# Patient Record
Sex: MALE | Race: AMERICAN INDIAN OR ALASKA NATIVE
[De-identification: names, ages, dates, MRNs, and addresses within clinical notes are randomized per-mention and may not be internally consistent; named-entity substitution may affect disease eponyms.]

---

## 2018-04-17 ENCOUNTER — HOSPITAL ENCOUNTER (INPATIENT)
Dept: HOSPITAL 42 - ED | Age: 78
LOS: 3 days | Discharge: HOME | DRG: 638 | End: 2018-04-20
Attending: INTERNAL MEDICINE | Admitting: INTERNAL MEDICINE
Payer: MEDICARE

## 2018-04-17 VITALS — BODY MASS INDEX: 30.3 KG/M2

## 2018-04-17 DIAGNOSIS — I10: ICD-10-CM

## 2018-04-17 DIAGNOSIS — L97.319: ICD-10-CM

## 2018-04-17 DIAGNOSIS — L03.115: ICD-10-CM

## 2018-04-17 DIAGNOSIS — I77.6: ICD-10-CM

## 2018-04-17 DIAGNOSIS — M65.9: ICD-10-CM

## 2018-04-17 DIAGNOSIS — E11.69: Primary | ICD-10-CM

## 2018-04-17 DIAGNOSIS — E78.5: ICD-10-CM

## 2018-04-17 DIAGNOSIS — E11.622: ICD-10-CM

## 2018-04-17 DIAGNOSIS — I25.10: ICD-10-CM

## 2018-04-17 DIAGNOSIS — M86.9: ICD-10-CM

## 2018-04-17 DIAGNOSIS — E66.3: ICD-10-CM

## 2018-04-17 DIAGNOSIS — Z85.46: ICD-10-CM

## 2018-04-17 DIAGNOSIS — Z79.84: ICD-10-CM

## 2018-04-17 LAB
ALBUMIN SERPL-MCNC: 4.4 G/DL (ref 3–4.8)
ALBUMIN/GLOB SERPL: 1.3 {RATIO} (ref 1.1–1.8)
ALT SERPL-CCNC: 29 U/L (ref 7–56)
APPEARANCE UR: CLEAR
AST SERPL-CCNC: 19 U/L (ref 17–59)
BASE EXCESS BLDV CALC-SCNC: 5.3 MMOL/L (ref 0–2)
BASOPHILS # BLD AUTO: 0 K/MM3 (ref 0–2)
BASOPHILS NFR BLD: 0 % (ref 0–3)
BILIRUB UR-MCNC: NEGATIVE MG/DL
BUN SERPL-MCNC: 18 MG/DL (ref 7–21)
CALCIUM SERPL-MCNC: 10.1 MG/DL (ref 8.4–10.5)
COLOR UR: YELLOW
EOSINOPHIL # BLD: 0 10*3/UL (ref 0–0.7)
EOSINOPHIL NFR BLD: 0.2 % (ref 1.5–5)
EOSINOPHIL NFR BLD: 1 % (ref 0–3)
ERYTHROCYTE [DISTWIDTH] IN BLOOD BY AUTOMATED COUNT: 13.2 % (ref 11.5–14.5)
GFR NON-AFRICAN AMERICAN: > 60
GLUCOSE UR STRIP-MCNC: NEGATIVE MG/DL
GRANULOCYTES # BLD: 2.78 10*3/UL (ref 1.4–6.5)
GRANULOCYTES NFR BLD: 47.3 % (ref 50–68)
HGB BLD-MCNC: 10.6 G/DL (ref 14–18)
LEUKOCYTE ESTERASE UR-ACNC: NEGATIVE LEU/UL
LYMPHOCYTE: 30 % (ref 22–35)
LYMPHOCYTES # BLD: 1.9 10*3/UL (ref 1.2–3.4)
LYMPHOCYTES NFR BLD AUTO: 31.7 % (ref 22–35)
MCH RBC QN AUTO: 28.6 PG (ref 25–35)
MCHC RBC AUTO-ENTMCNC: 32.5 G/DL (ref 31–37)
MCV RBC AUTO: 88.1 FL (ref 80–105)
MONOCYTE: 9 % (ref 1–6)
MONOCYTES # BLD AUTO: 1.2 10*3/UL (ref 0.1–0.6)
MONOCYTES NFR BLD: 20.8 % (ref 1–6)
NEUTROPHILS NFR BLD AUTO: 60 % (ref 50–70)
PH BLDV: 7.32 [PH] (ref 7.32–7.43)
PH UR STRIP: 7 [PH] (ref 4.7–8)
PLATELET # BLD EST: NORMAL 10*3/UL
PLATELET # BLD: 126 10^3/UL (ref 120–450)
PMV BLD AUTO: 12.3 FL (ref 7–11)
PROT UR STRIP-MCNC: (no result) MG/DL
RBC # BLD AUTO: 3.7 10^6/UL (ref 3.5–6.1)
RBC # UR STRIP: NEGATIVE /UL
RBC #/AREA URNS HPF: NEGATIVE /HPF (ref 0–2)
SP GR UR STRIP: 1.01 (ref 1–1.03)
UROBILINOGEN UR STRIP-ACNC: 0.2 E.U./DL
VENOUS BLOOD FIO2: 21 %
VENOUS BLOOD GAS PCO2: 65 (ref 40–60)
VENOUS BLOOD GAS PO2: 35 MM/HG (ref 30–55)
WBC # BLD AUTO: 5.9 10^3/UL (ref 4.5–11)
WBC #/AREA URNS HPF: NEGATIVE /HPF (ref 0–6)

## 2018-04-17 RX ADMIN — VANCOMYCIN HYDROCHLORIDE SCH MLS/HR: 1 INJECTION, POWDER, LYOPHILIZED, FOR SOLUTION INTRAVENOUS at 21:00

## 2018-04-17 NOTE — ED PDOC
Arrival/HPI





- General


Chief Complaint: Lower Extremity Problem/Injury


Time Seen by Provider: 04/17/18 15:50


Historian: Patient





- History of Present Illness


Narrative History of Present Illness (Text): 





04/17/18 17:13


77-year-old male presents today with a nonhealing wound to the medial aspect of 

the right ankle. Patient states she was sent into the emergency room by his 

podiatrist today for admission. Patient states he is having increasing pain to 

the medial aspect of the right ankle with increasing swelling and erythema. 

Patient denies fevers or chills. Denies chest pain or shortness of breath. 

Patient denies dizziness or weakness. Patient states he does not take any 

medications for pain at home. Patient states he's been on multiple antibiotics 

for this wound without improvement. Patient denies numbness weakness or 

tingling in the extremity. No other complaints.





Past Medical History





- Provider Review


Nursing Documentation Reviewed: Yes





- Travel History


Have you recently traveled outside [place] w/in the past 3 mons?: No





- Infectious Disease


Hx of Infectious Diseases: None





- Tetanus Immunization


Tetanus Immunization: Unknown





- Cardiac


Hx Cardiac Disorders: Yes


Hx Hypertension: Yes


Hx Pacemaker: No





- Pulmonary


Hx Respiratory Disorders: No





- Neurological


Hx Neurological Disorder: No


Hx Paralysis: No





- HEENT


Hx HEENT Disorder: Yes


Hx Cataracts: Yes (BILAT.)





- Renal


Hx Renal Disorder: No





- Endocrine/Metabolic


Hx Endocrine Disorders: Yes


Hx Diabetes Mellitus Type 2: Yes





- Hematological/Oncological


Hx Blood Disorders: Yes


Hx Blood Transfusions: Yes


Hx Blood Transfusion Reaction: No


Hx Cancer: Yes (prostate)





- Integumentary


Hx Dermatological Disorder: No





- Musculoskeletal/Rheumatological


Hx Musculoskeletal Disorders: Yes


Hx Arthritis: Yes


Hx Degenerative Joint Disease: Yes


Hx Falls: No


Other/Comment: HX: RIGHT SHOULDER -SURGERY"THEY PUT A NEW ONE IN.".  HX: RIGHT 

TOES- INJURY AT WORK-SURGERY TO REPAIR





- Gastrointestinal


Hx Gastrointestinal Disorders: No





- Genitourinary/Gynecological


Hx Genitourinary Disorders: Yes


Hx Prostate Cancer: Yes





- Psychiatric


Hx Psychophysiologic Disorder: No


Hx Depression: No


Hx Substance Use: No





- Surgical History


Hx Cataract Extraction: Yes (RIGHT EYE)


Hx Cardiac Catheterization: Yes


Hx Orthopedic Surgery: Yes


Other/Comment: STOMACH/FOOT SURGERY





- Anesthesia


Hx Anesthesia: Yes


Hx Anesthesia Reactions: No


Hx Malignant Hyperthermia: No





- Suicidal Assessment


Feels Threatened In Home Enviroment: No





Family/Social History





- Physician Review


Nursing Documentation Reviewed: Yes


Family/Social History: Unknown Family HX


Smoking Status: Never Smoked


Hx Alcohol Use: No


Hx Substance Use: No


Hx Substance Use Treatment: No





Allergies/Home Meds


Allergies/Adverse Reactions: 


Allergies





lisinopril Allergy (Verified 09/20/16 16:11)


 ANAPHYLAXIS








Home Medications: 


 Home Meds











 Medication  Instructions  Recorded  Confirmed


 


Aspirin [Ecotrin] 325 mg PO DAILY 08/05/15 04/17/18


 


Bicalutamide 50 mg PO DAILY 08/05/15 04/17/18


 


Raúl Cit/D3/K/Mag Ox/Stron/Bor 2 tab PO BID 08/05/15 04/17/18





[Prosteon]   


 


Metformin HCl [Metformin] 1,000 mg PO BID 08/05/15 04/17/18


 


amLODIPine [Norvasc] 10 mg PO DAILY 08/05/15 04/17/18


 


Calcium Citrate 200 mg PO DAILY 07/06/17 04/17/18














Review of Systems





- Review of Systems


Constitutional: absent: Fatigue, Fevers


Respiratory: absent: SOB, Cough


Cardiovascular: absent: Chest Pain, Palpitations


Gastrointestinal: absent: Abdominal Pain, Nausea, Vomiting


Musculoskeletal: Arthralgias.  absent: Back Pain, Neck Pain


Skin: Skin Lesions, Cellulitis


Neurological: absent: Headache, Dizziness


Psychiatric: absent: Anxiety, Depression





Physical Exam


Vital Signs Reviewed: Yes


Vital Signs











  Temp Pulse Resp BP Pulse Ox


 


 04/17/18 17:07   94 H  18  122/69  98


 


 04/17/18 15:45  99.5 F  100 H  20  124/72  98











Temperature: Afebrile


Blood Pressure: Normal


Pulse: Tachycardic


Respiratory Rate: Normal


Appearance: Positive for: Well-Appearing, Non-Toxic, Comfortable


Pain Distress: None


Mental Status: Positive for: Alert and Oriented X 3





- Systems Exam


Head: Present: Atraumatic


Mouth: Present: Moist Mucous Membranes


Respiratory/Chest: Present: Clear to Auscultation


Cardiovascular: Present: Regular Rate and Rhythm


Abdomen: No: Tenderness, Distention, Peritoneal Signs, Rebound, Guarding


Upper Extremity: Present: Normal ROM


Lower Extremity: Present: NORMAL PULSES, Tenderness (right leg; + ulceration 

noted to medial aspect of right ankle with surrounding erythema and tenderness; 

+ edema noted to foot and ankle; sensation and distal pulses intact; cap refill 

<2. ), Swelling, Erythema, Neurovascularly Intact, Capillary Refill < 2 s.  No: 

CALF TENDERNESS


Neurological: Present: GCS=15, Speech Normal


Skin: Present: Warm, Dry


Psychiatric: Present: Alert, Oriented x 3





Medical Decision Making


ED Course and Treatment: 





04/17/18 17:19


77-year-old male with ulceration to the medial aspect of the right ankle that 

has been nonhealing. Now with surrounding erythema and edema.








CBC hgb;10.6


CMP: glucose; 113


Blood cultures pending


Urine cultures pending


Wound cultures pending





Vancomycin and Zosyn started IV








Per patient's podiatrist patient had negative x-ray of the ankle and will need 

MRI to rule out osteomyelitis. 








Case discussed in depth with Dr. Briones covering for dr. isidro mosquera patients;

  accepts admission











impression; infected ulcer


admit to med/surg











- Lab Interpretations


Lab Results: 








 04/17/18 17:00 





 04/17/18 17:00 





 Lab Results





04/17/18 17:00: WBC 5.9  D, RBC 3.70, Hgb 10.6 L, Hct 32.6 L, MCV 88.1, MCH 28.6

, MCHC 32.5, RDW 13.2, Plt Count 126, MPV 12.3 H, Gran % 47.3 L, Lymph % (Auto) 

31.7, Mono % (Auto) 20.8 H, Eos % (Auto) 0.2 L, Baso % (Auto) 0.0, Gran # 2.78, 

Lymph # (Auto) 1.9, Mono # (Auto) 1.2 H, Eos # (Auto) 0.0, Baso # (Auto) 0.00, 

Neutrophils % (Manual) 60, Lymphocytes % (Manual) 30, Monocytes % (Manual) 9 H, 

Eosinophils % (Manual) 1, Platelet Evaluation Normal


04/17/18 17:00: Sodium 143, Chloride 101, Potassium 4.1, Carbon Dioxide 31, 

Anion Gap 14, BUN 18, Creatinine 1.0, Est GFR (African Amer) > 60, Est GFR (Non-

Af Amer) > 60, Random Glucose 113 H, Calcium 10.1, Total Bilirubin 0.3, AST 19, 

ALT 29, Alkaline Phosphatase 65, Total Protein 7.7, Albumin 4.4, Globulin 3.3, 

Albumin/Globulin Ratio 1.3


04/17/18 17:00: pO2 35, VBG pH 7.32, VBG pCO2 65.0 H, VBG HCO3 33.5 H, VBG 

Total CO2 35.5 H, VBG O2 Sat (Calc) 66.5 H, VBG Base Excess 5.3 H, VBG 

Potassium 4.0, Sodium 142.0, Chloride 107.0, Glucose 115 H, Lactate 1.3, FiO2 

21.0, Venous Blood Potassium 4.0


04/17/18 16:59: POC Glucose (mg/dL) 113 H











- RAD Interpretation


Radiology Orders: 








04/17/18 16:46


DUPLEX LOWER EXTRM VEIN RIGHT [US] Stat 














- Medication Orders


Current Medication Orders: 











Discontinued Medications





Vancomycin HCl (Vancomycin 1gm)  1 gm in 250 mls @ 167 mls/hr IVPB STAT STA


   PRN Reason: Protocol


   Stop: 04/17/18 18:18


   Last Admin: 04/17/18 17:53  Dose: 167 mls/hr





eMAR Start Stop


 Document     04/17/18 17:53  HI  (Rec: 04/17/18 17:53  HI  [id number])


     Intravenous Solution


      Start Date                                 04/17/18


      Start Time                                 17:53





Piperacillin Sod/Tazobactam Sod (Zosyn 3.375 In Ns 100ml)  100 mls @ 200 mls/hr 

IVPB STAT STA


   PRN Reason: Protocol


   Stop: 04/17/18 17:18


   Last Admin: 04/17/18 17:09  Dose: 200 mls/hr





eMAR Start Stop


 Document     04/17/18 17:09  RG  (Rec: 04/17/18 17:11  RG  [id number])


     Intravenous Solution


      Start Date                                 04/17/18


      Start Time                                 17:09














Disposition/Present on Arrival





- Present on Arrival


Any Indicators Present on Arrival: Yes


History of DVT/PE: No


History of Uncontrolled Diabetes: Yes


Urinary Catheter: No


History of Decub. Ulcer: No


History Surgical Site Infection Following: None





- Disposition


Have Diagnosis and Disposition been Completed?: Yes


Diagnosis: 


 Infected ulcer of skin





Disposition: HOSPITALIZED


Disposition Time: 18:43


Patient Plan: Admission


Condition: FAIR


Referrals: 


Isidro Guevara MD [Primary Care Provider] - Follow up with primary


Forms:  POW (English)

## 2018-04-17 NOTE — US
PROCEDURE:  Right lower extremity venous US 



HISTORY:

Leg pain and swelling. Evaluate for DVT.



PHYSICIAN(S):  Darryl Trevizo M.D.



TECHNIQUE:

Duplex sonography and color-flow Doppler with graded compression were 

used to evaluate the deep venous system of the right lower extremity. 



FINDINGS:

The visualized deep venous system of the right lower extremity is 

sonographically normal and compressible. Normal waveforms and 

augmentation are seen. There is no sonographic evidence for deep 

venous thrombosis in the visualized segments of the right lower 

extremity.



IMPRESSION:

1. No sonographic evidence for deep venous thrombosis in the 

visualized segments of the right lower extremity.

## 2018-04-18 RX ADMIN — VANCOMYCIN HYDROCHLORIDE SCH MLS/HR: 1 INJECTION, POWDER, LYOPHILIZED, FOR SOLUTION INTRAVENOUS at 08:17

## 2018-04-18 RX ADMIN — ASPIRIN SCH MG: 325 TABLET, DELAYED RELEASE ORAL at 10:11

## 2018-04-18 RX ADMIN — VANCOMYCIN HYDROCHLORIDE SCH MLS/HR: 1 INJECTION, POWDER, LYOPHILIZED, FOR SOLUTION INTRAVENOUS at 20:37

## 2018-04-18 RX ADMIN — INSULIN LISPRO SCH: 100 INJECTION, SOLUTION INTRAVENOUS; SUBCUTANEOUS at 00:33

## 2018-04-18 RX ADMIN — INSULIN LISPRO SCH: 100 INJECTION, SOLUTION INTRAVENOUS; SUBCUTANEOUS at 07:42

## 2018-04-18 RX ADMIN — INSULIN LISPRO SCH UNITS: 100 INJECTION, SOLUTION INTRAVENOUS; SUBCUTANEOUS at 12:04

## 2018-04-18 RX ADMIN — MEROPENEM SCH MLS/HR: 1 INJECTION INTRAVENOUS at 22:33

## 2018-04-18 RX ADMIN — METHYLPREDNISOLONE SODIUM SUCCINATE SCH MG: 40 INJECTION, POWDER, FOR SOLUTION INTRAMUSCULAR; INTRAVENOUS at 10:12

## 2018-04-18 RX ADMIN — MEROPENEM SCH: 1 INJECTION INTRAVENOUS at 20:45

## 2018-04-18 RX ADMIN — INSULIN LISPRO SCH: 100 INJECTION, SOLUTION INTRAVENOUS; SUBCUTANEOUS at 21:49

## 2018-04-18 RX ADMIN — PIPERACILLIN AND TAZOBACTAM SCH MLS/HR: 3; .375 INJECTION, POWDER, LYOPHILIZED, FOR SOLUTION INTRAVENOUS; PARENTERAL at 05:55

## 2018-04-18 RX ADMIN — METHYLPREDNISOLONE SODIUM SUCCINATE SCH MG: 40 INJECTION, POWDER, FOR SOLUTION INTRAMUSCULAR; INTRAVENOUS at 17:05

## 2018-04-18 RX ADMIN — PIPERACILLIN AND TAZOBACTAM SCH MLS/HR: 3; .375 INJECTION, POWDER, LYOPHILIZED, FOR SOLUTION INTRAVENOUS; PARENTERAL at 00:32

## 2018-04-18 RX ADMIN — ASPIRIN SCH: 325 TABLET, DELAYED RELEASE ORAL at 10:20

## 2018-04-18 RX ADMIN — INSULIN LISPRO SCH UNITS: 100 INJECTION, SOLUTION INTRAVENOUS; SUBCUTANEOUS at 17:05

## 2018-04-18 NOTE — PN
DATE:



SUBJECTIVE:  The patient is a 77-year-old who was sent by Dr. Ayala for

nonhealing ulcer.  The patient had biopsy done that was nondiagnostic. 

Also, had arterial study done that was also unremarkable for PVD, so sent

here for further followup.  The patient was also evaluated by Dr. Castillo

who did autoimmune workup, was also negative.



PHYSICAL EXAMINATION:

GENERAL:  Today, he is awake and alert, communicative.

VITAL SIGNS:  He is afebrile, pulse 95, respirations 20, blood pressure

149/100.

LUNGS:  Bilateral good airflow.  No rhonchi or crackle.

HEART:  S1 and S2 audible.

ABDOMEN:  Soft, nontender.  No rebound.  No guarding.

NEUROLOGICAL:  He is awake and alert, able to communicate.

EXTREMITIES:  He has erythema around his ankle and right foot.  Left foot

and ankle is within normal limits.



LABORATORY DATA:  His ESR is 30.  Chemistry:  Blood sugar is 187,

C-reactive protein is 6.58.  Urinalysis is unremarkable.  Ankle wound shows

gram-negative rods.  MRI is pending.  X-rays are unremarkable.



ASSESSMENT:

1.  Non-healing right ankle medial malleolar ulcer, differential is

autoimmune versus diabetic ulcer.

2.  Non-insulin dependent diabetes.

3.  Hypertension.

4.  Overweight.

5.  History of cancer of prostate.



PLAN:  Discussed with Dr. Darryl Trevizo.  We will give him high dose of

steroids.  Monitor his ulcer.  We will continue antibiotic and have local

wound care and monitor his blood sugar.







__________________________________________

Kelly Briones MD



DD:  04/18/2018 12:43:06

DT:  04/18/2018 14:04:34

Job # [id number]

## 2018-04-18 NOTE — PN
DATE:  04/18/2018



SUBJECTIVE:  This is a 77-year-old diabetic male, seen for a nonhealing

ulceration to his right lower extremity.  Patient states it started 3

months ago as a blister and has progressively gotten worse.  He states that

he saw Dr. Ayala yesterday and Dr. Ayala noted that the leg was red and

brought patient into the hospital.  Patient has a past medical history of

prostate cancer for which he is taking Casodex.  He also has a history of

type 2 diabetes.  Patient has had venous Dopplers.  He has had extremity

arterial Dopplers and everything was within normal limits.  There was no

blood clot seen in the leg and his vascular was within normal limits.  I

did state to Dr. Trevizo about this.  The patient also saw the rheumatologist

for an autoimmune workup and that workup was also negative.  Dr. Castillo,

the rheumatologist, apparently stated that there is a possibility of

placing patient on Plaquenil and prednisone if vasculitis is noted. 

Patient also had a skin biopsy and it was noted that he did have a focal

vasculitis and stromal changes in the epidermis and it was consistent with

venous stasis changes and it was also negative for malignancy.



PHYSICAL EXAMINATION:

VITAL SIGNS:  Patient's vital signs were noted today.  His temperature is

98.6, his pulse is 95, blood pressure is 149/100, his respiratory rate was

20 and his oxygen sat is 97.



MEDICATIONS:  Noted on the MAR and he is presently on vancomycin as per

Infectious Disease.  He has been started on methylprednisolone for

vasculitis.  He is on Norvasc.  He is being covered with Humalog.  He is

taking Glucophage, Ecotrin and Casodex.



LABORATORY DATA:  Patient's labs were reviewed.  His white blood cell count

is 5.9.  His ESR is 30.  H and H is 10.6 and 32.6 and platelets are 126. 

He has granulocytes of 47.3, lymphocytes are 31.1 and this is a shift to

the right, not to the left, which would be for an acute infection. 

Patient's chemistry is grossly within normal limits.  His random glucose at

this time is 113 yesterday.  Today, it is 187.



Patient as noted has been started on prednisone.  Unfortunately, that will

make those glucoses erratic.  His microbiology for all the visits were

coag-negative Staph that I have seen in the computer, Gram stain done

yesterday was pending, and clinically the patient has 2/4 palpable pedal

pulses.  His temperature gradient is warm on the right and within normal

limits on the left.  He does have a +3 edema to the right foot and ankle

and no edema is noted on the left foot and ankle.  Patient is severely

painful with palpation of his Achilles tendon and the tenderness is more so

at the mild tenderness junction and at the insertion.  He also has a lot of

pain with forced dorsiflexion of his foot, again in the area of the calf

and the Achilles tendon.  There does not appear to be any fluctuance around

the wound. There is redness around the wound and the tissue is fibrous with

some hemorrhage tissue in the upper corner of the wound.  The wound

measures approximately 2 x 2.5 x 0.1 cm.  There is no sinus tract.  There

is no fluctuance noted.



ASSESSMENT:  A non-healing ulceration to the right leg, nonpressure

ulceration.



PLAN OF TREATMENT:  MRI was ordered.  I asked them to pinpoint on the

Achilles tendon to rule out any Achilles tendon tear.  We will also look

for any abscess in the leg.  Patient might need a second biopsy that we can

send this to a Dermpath.  X-rays were also ordered to take a look at the

bones on the ankles and some medications he is on can cause osteopenia and

it also causes angioedema and edema of the extremities.  This was all

discussed with patient and at this time, we will await the findings of the

imaging and I think, the patient most likely will need compression

dressings if all these are negative.  Patient was dressed with a film

dressing today and we ordered Bactroban ointment to be placed on the wound

under the film.





__________________________________________

Ifrah Curtis DPM



DD:  04/18/2018 11:47:50

DT:  04/18/2018 13:46:32

Job # [id number]

## 2018-04-18 NOTE — HP
HISTORY OF PRESENT ILLNESS:  The patient is a 77-year-old ,

very pleasant gentleman, who was referred by Dr. Ayala for nonhealing

ulcer and wound on the right ankle medial malleolar part.  He has been

followed with him regularly in wound care center.  He states that he has

pain on _____ has been increasingly getting worse along with swelling and

redness.  Denies any trauma to the area, no history of falls, no history of

fever or chills, no history of nausea or vomiting, no diarrhea, no chest

pain, no shortness of breath.  The patient states he has been regularly

following with the podiatrist and has multiple rounds of antibiotics.



PAST MEDICAL HISTORY:  Significant for

1.  Hypertension.

2.  Non-insulin-dependent diabetes.

3.  History of CA prostate, on Casodex.

4.  History of penile lesion excision.

5.  History of degenerative disk disease.



PAST SURGICAL HISTORY:  Significant for

1.  Bilateral cataract extraction.

2.  Status post right shoulder surgery.



ALLERGY:  HE IS ALLERGIC TO LISINOPRIL.



MEDICATIONS AT HOME:  He is on

1.  Multivitamin.

2.  Aspirin 325 daily.

3.  Norvasc 10 mg daily.

4.  Metformin 1000 twice a day.

5.  Casodex 50 mg daily.



SOCIAL HISTORY:  Denies smoking, drinking or alcohol use.



PHYSICAL EXAMINATION:

GENERAL:  He is awake, alert, oriented, communicative.

VITAL SIGNS:  His temperature 99.5, pulse 100, respirations 20, blood

pressure 124/72.

LUNGS:  Bilateral good airflow.  No rhonchi or crackle.

HEART:  S1 and S2 audible.  No murmur.

ABDOMEN:  Soft, nontender, no rebound, no guarding.

NEUROLOGIC:  The patient is awake and alert, communicative.

EXTREMITIES:  Right _____ medial malleolar aspect, there is ulceration on

the medal aspect along with erythema and palpable tenderness, also swelling

of the foot and ankle.  Distal pulses seem to be intact.



LABORATORY DATA:  WBC 5.9, hemoglobin 10.6, hematocrit 32.6, platelets of

126.  Chemistry:  Sodium 143, potassium 4.1, chloride 101, CO2 of 31, BUN

18, creatinine 1, blood sugar of 113.  LFTs are within normal limits. 

Urinalysis is unremarkable.  He had leg Doppler done that was negative for

DVT.  X-ray of the ankle done on 04/10/2018 is normal.



ASSESSMENT AND PLAN:

1.  Nonhealing right ankle medial malleolar wound.

2.  Non-insulin-dependent diabetes.

3.  Hypertension.

4.  History of cancer of prostate.



PLAN:  The patient will be admitted.  We will give him IV antibiotic,

monitor blood sugar.  ID consult by Dr. Rivera and Podiatry consult by

Dr. Ayala has been requested.







__________________________________________

Kelly Briones MD



DD:  04/17/2018 19:34:27

DT:  04/17/2018 21:40:48

Job # [id number]

## 2018-04-18 NOTE — RAD
PROCEDURE:  Right Ankle Radiographs.



HISTORY:

Pain



COMPARISON:

None



FINDINGS:



BONES:

No evidence of acute displaced fracture nor dislocation. . The 

osseous structures appear intact. Talar dome intact.  Small plantar 

and posterior calcaneal enthesophyte formation noted. 



JOINTS:

Ankle mortise maintained Degenerative changes of the tibiotalar 

articulation.  There is also a calcification along the lateral aspect 

of the distal right tibial diametaphysis which may be secondary to an 

old posttraumatic sequela or possibly ossification of the 

interosseous membrane. 



SOFT TISSUES:

There is mild soft tissue swelling over the medial and lateral 

malleoli former slightly more so than latter. 



OTHER FINDINGS:

None.



IMPRESSION:

No acute fractures.  Degenerative osteoarthritis as described.  

Probable ossification interosseous membrane.



Mild bilateral soft tissue swelling medial slightly greater than 

lateral.

## 2018-04-19 VITALS — RESPIRATION RATE: 20 BRPM

## 2018-04-19 LAB
ALBUMIN SERPL-MCNC: 3.8 G/DL (ref 3–4.8)
ALBUMIN/GLOB SERPL: 1.2 {RATIO} (ref 1.1–1.8)
ALT SERPL-CCNC: 21 U/L (ref 7–56)
AST SERPL-CCNC: 27 U/L (ref 17–59)
BUN SERPL-MCNC: 21 MG/DL (ref 7–21)
CALCIUM SERPL-MCNC: 9.7 MG/DL (ref 8.4–10.5)
GFR NON-AFRICAN AMERICAN: > 60
T4 FREE SERPL-MCNC: 1.26 NG/DL (ref 0.78–2.19)

## 2018-04-19 PROCEDURE — 02HV33Z INSERTION OF INFUSION DEVICE INTO SUPERIOR VENA CAVA, PERCUTANEOUS APPROACH: ICD-10-PCS | Performed by: INTERNAL MEDICINE

## 2018-04-19 PROCEDURE — B548ZZA ULTRASONOGRAPHY OF SUPERIOR VENA CAVA, GUIDANCE: ICD-10-PCS | Performed by: INTERNAL MEDICINE

## 2018-04-19 RX ADMIN — MEROPENEM SCH MLS/HR: 1 INJECTION INTRAVENOUS at 13:03

## 2018-04-19 RX ADMIN — INSULIN LISPRO SCH UNITS: 100 INJECTION, SOLUTION INTRAVENOUS; SUBCUTANEOUS at 16:27

## 2018-04-19 RX ADMIN — VANCOMYCIN HYDROCHLORIDE SCH MLS/HR: 1 INJECTION, POWDER, LYOPHILIZED, FOR SOLUTION INTRAVENOUS at 08:03

## 2018-04-19 RX ADMIN — INSULIN LISPRO SCH UNITS: 100 INJECTION, SOLUTION INTRAVENOUS; SUBCUTANEOUS at 12:00

## 2018-04-19 RX ADMIN — MEROPENEM SCH MLS/HR: 1 INJECTION INTRAVENOUS at 21:32

## 2018-04-19 RX ADMIN — MEROPENEM SCH MLS/HR: 1 INJECTION INTRAVENOUS at 06:35

## 2018-04-19 RX ADMIN — INSULIN LISPRO SCH UNITS: 100 INJECTION, SOLUTION INTRAVENOUS; SUBCUTANEOUS at 08:02

## 2018-04-19 RX ADMIN — ASPIRIN SCH MG: 325 TABLET, DELAYED RELEASE ORAL at 10:47

## 2018-04-19 RX ADMIN — INSULIN LISPRO SCH: 100 INJECTION, SOLUTION INTRAVENOUS; SUBCUTANEOUS at 20:00

## 2018-04-19 RX ADMIN — METHYLPREDNISOLONE SODIUM SUCCINATE SCH MG: 40 INJECTION, POWDER, FOR SOLUTION INTRAMUSCULAR; INTRAVENOUS at 01:44

## 2018-04-19 RX ADMIN — VANCOMYCIN HYDROCHLORIDE SCH MLS/HR: 1 INJECTION, POWDER, LYOPHILIZED, FOR SOLUTION INTRAVENOUS at 18:29

## 2018-04-19 RX ADMIN — METHYLPREDNISOLONE SODIUM SUCCINATE SCH MG: 40 INJECTION, POWDER, FOR SOLUTION INTRAMUSCULAR; INTRAVENOUS at 10:46

## 2018-04-19 NOTE — CON
DATE:  04/18/2018



REASON FOR CONSULTATION:  Prostate cancer, nonhealing ulcer of left ankle.



HISTORY OF PRESENT ILLNESS:  Mr. Tsang is a 77-year-old 

male admitted to the hospital with right ankle ulceration, which has been

there for three months.  He was diagnosed with prostate cancer nine years

ago, underwent prostatectomy.  Since then, he has been on Casodex and

Lupron every three months.  There was a concern whether Casodex is causing

nonhealing ulcer on the right leg.  As per information given by the

patient, he has been in remission from prostate cancer.  He follows with

Dr. Trujillo, Urology in [place].  He has received multiple rounds of

antibiotics for right ankle ulceration.



PAST MEDICAL HISTORY:  Hypertension, diabetes mellitus type 2, prostate

cancer, history of degenerative disc disease.



PAST SURGICAL HISTORY:  Bilateral cataract extraction, status post shoulder

surgery, prostatectomy.



ALLERGIES:  LISINOPRIL.



HOME MEDICATIONS:  Multivitamin, Casodex, metformin, Norvasc, aspirin.



SOCIAL HISTORY:  Denies any smoking.  No history of alcohol abuse.



FAMILY HISTORY:  Noncontributory.



REVIEW OF SYSTEMS:  As per HPI.  Rest of 12-point review of systems

reviewed negative.



PHYSICAL EXAMINATION:

GENERAL:  Awake, alert, oriented, communicative.

VITAL SIGNS:  Stable.  Temperature 98.7, heart rate 80 per minute,

respiratory rate 18 per minute, blood pressure 124/74.

HEENT:  Normal.

NECK:  No lymphadenopathy.

CHEST:  Air entry present and equal bilaterally.  No added sounds.

CARDIOVASCULAR:  S1, S2 normal.  No murmur.  No gallop.

ABDOMEN:  Soft, nontender.  No hepatosplenomegaly.  No rigidity.  No

guarding.

NEURO:  Awake, alert, oriented x3.  No focal sensory or motor deficit.

EXTREMITIES:  Right leg superficial ulceration with whitish base.  No

secretions present on the medial malleolus.



LABORATORY DATA:  Hemoglobin 10.6, white count 5.9, hematocrit 32.6,

platelets 126.  Sodium 143, potassium 4.1.  BUN 18, creatinine 1.  Blood

sugar 113.



ASSESSMENT:

1.  Prostate cancer.

2.  Nonhealing ulcer on the right malleolus.

3.  Hypertension.

4.  Diabetes mellitus type 2.



PLAN:

1.  Prostate cancer.  We will do the PSA level.  As per information given

by the patient, he has been in remission.  He denies any imaging done for

prostate cancer.  We will await PSA results.  If elevated PSA, we will do a

bone scan.  MRI of the right ankle has been ordered.  MANJIT did not show

peripheral vascular disease.

2.  ID, right ankle showing gram-negative rods.  ID Dr. Rivera is

following.  He is currently on broad-spectrum antibiotics.

3.  There is concern for Casodex causing nonhealing right ulcer.  Casodex

is not related to nonhealing ulcer, neither it has been known to decrease

immunity.  I would recommend continuing Casodex and Lupron.

4.  We will consider staging workup for prostate cancer.  CT of chest,

abdomen, pelvis and bone scans since it has been 10 years since diagnosis. 

I discussed with the patient, discussed with Dr. Briones.



Thank you, Dr. Briones, for allowing us to participate in Mr. Tsang' care.





__________________________________________

Aurelia Dennis MD



DD:  04/18/2018 23:18:33

DT:  04/19/2018 0:01:22

Job # [id number]

## 2018-04-19 NOTE — MRI
MRI right tibia and fibula 



History: Ulcer.  Evaluate for osteomyelitis. 



Comparison: None available. 



Technique: Multi-echo multiplanar sequences were performed through 

the right tibia and fibula without the use of intravenous contrast. 



Findings: 



Prominent soft tissue ulcer seen within the medial soft tissues at 

the level of the distal medial tibia.  Signal abnormality within the 

medial cortex of the medial malleolus of the distal tibia with patchy 

increased STIR signal and patchy decreased T1 signal suggestive for 

an acute osteomyelitis. Clinical correlation. Please see separate MR 

report of the right ankle for better delineation of this region.  

Mild reactive edema seen within the posterior musculature at that 

level which may represent an underlying myositis. 



Reticulation and edema within the circumferential subcutaneous soft 

tissues of the lower extremity suggestive for an underlying 

cellulitis. 



Remainder of the visualized osseous structures and musculature appear 

grossly preserved. 



Impression: 



Prominent soft tissue ulcer seen within the medial soft tissues at 

the level of the distal medial tibia.  Signal abnormality within the 

medial cortex of the medial malleolus of the distal tibia with patchy 

increased STIR signal and patchy decreased T1 signal suggestive for 

an acute osteomyelitis. Clinical correlation. Please see separate MR 

report of the right ankle for better delineation of this region. Mild 

reactive edema seen within the posterior musculature at that level 

which may represent an underlying myositis. 



Reticulation and edema within the circumferential subcutaneous soft 

tissues of the lower extremity suggestive for an underlying 

cellulitis.

## 2018-04-19 NOTE — PN
DATE:



SUBJECTIVE:  The patient is a 77-year-old black male, seen and examined. 

He stats his pain and swelling is much better.  Denies any nausea, vomiting

or diarrhea.



PHYSICAL EXAMINATION:

VITAL SIGNS:  He is afebrile.  Pulse 96, respirations 20, blood pressure

135/75.

LUNGS:  Bilateral good airflow.  No rhonchi or crackle.

HEART:  S1, S2 audible.

ABDOMEN:  Soft, nontender.  No rebound.  No guarding.

NEUROLOGIC:  Awake, alert, oriented and communicative.



LABORATORY DATA:  His ESR is 30.  Chemistry:  Sodium 142, potassium 3.8,

chloride 106, CO2 of 27, BUN 21, creatinine 0.9.  Blood sugar of 200. 

Hemoglobin A1c is 7.6.



DIAGNOSTIC DATA:  His MRI of the lower extremity including ankle shows

acute osteomyelitis with prominent soft tissue ulcers on the medial soft

tissue at distal medial tibia consistent with acute osteomyelitis.



ASSESSMENT:

1.  Right medial malleolus osteomyelitis.

2.  Non-insulin dependent diabetes.

3.  Hypertension.

4.  History of cancer of prostate.



PLAN:  I will discontinue Solu-Medrol.  Patient need 4 to 6 week of

meropenem and vancomycin and we will continue local wound care.  Patient is

scheduled for PICC line.  Once arrangement is made, patient will be sent to

subacute rehab for completion of course of antibiotics.







__________________________________________

Kelly Briones MD



DD:  04/19/2018 17:01:21

DT:  04/19/2018 19:50:35

Job # [id number]

## 2018-04-19 NOTE — CON
DATE:  04/18/2018



LOCATION:  The patient is seen earlier in room 564, bed 1.



CHIEF COMPLAINT:  Right ankle ulcer times several days.



HISTORY OF PRESENT ILLNESS:  This is a 77-year-old male with history of

diabetes mellitus, hypertension, hyperlipidemia, cataract, arthritis,

degenerative joint disease, prostate cancer, who has had a lesion that

started off with bullous lesion and it broke out in the right medial aspect

of his right ankle.  He denies any fevers and chills.  No chest pain.  No

abdominal pain, diarrhea or constipation.  No bright red blood per rectum. 

No melena.



REVIEW OF SYSTEMS:  Twelve-point review of systems is noted.  The patient

has no headaches, blurred vision, neck pain, sore throat and no chest pain,

shortness of breath or cough.  No diarrhea or constipation.  No bright red

blood per rectum.



PAST MEDICAL HISTORY:  Significant for diabetes mellitus, coronary artery

disease, hyperlipidemia, hypertension, cataracts, arthritis, degenerative

joint disease and prostate cancer.



PAST SURGICAL HISTORY:  Significant for radical prostatectomy, right

shoulder surgery and a bilateral cataract surgery.



ALLERGIES:  THE PATIENT IS ALLERGIC TO LISINOPRIL.



MEDICATIONS AT HOME:  Include the patient to be on metformin.



PHYSICAL EXAMINATION:

GENERAL:  The patient is in bed.

VITAL SIGNS:  Temperature of 98, heart rate of 100, respiratory rate of 20,

blood pressure is 140/100.

HEENT:  Examination of HEENT is unremarkable.

NECK:  Supple.

LUNGS:  Have decreased breath sounds.

HEART:  Normal S1 and S2.

ABDOMEN:  Soft, nontender.

EXTREMITIES:  Ankle ulcer is on the right medial aspect of the right ankle.

There is an open ulcer and there is erythema around it and discharge.



LABORATORY DATA:  Laboratory examination reveals the patient has a white

count of 5.9, hemoglobin of 10, platelets of 126.  Sed rate of 30.  BUN of

18, creatinine of 1, random glucose is 113.  C-reactive protein is 6.58. 

Sed rate is 30.  Urinalysis is noted.  Blood cultures are negative.  The

urine culture is not done.  Ankle culture is growing a gram-negative colin.



ASSESSMENT AND PLAN:  A 77-year-old male with diabetes mellitus, prostate

cancer, hypertension, coronary artery disease, hyperlipidemia and

cataracts, arthritis, degenerative joint disease.  He had a right ankle

medial leg ulcer and cellulitis and with gram-negative rods.  He is on

meropenem at this time and the patient is already on vancomycin.  The

patient is scheduled for MRI to rule out underlying osteomyelitis. 

Vascular consultation and Podiatry consultation are needed and appreciated.

We will make further recommendations upon availability of initial results. 

We will follow closely with you.



__________________________________________

Arian Rivera MD





DD:  04/18/2018 19:33:35

DT:  04/19/2018 1:52:41

Job # [id number]

## 2018-04-19 NOTE — RAD
HISTORY:

CONFIRM PICC LINE PLACEMENT  



COMPARISON:

No prior. 



FINDINGS:



LUNGS:

No active pulmonary disease.



PLEURA:

No significant pleural effusion identified, no pneumothorax apparent.



CARDIOVASCULAR:

 No radiographic findings to suggest acute or significant 

cardiovascular disease. PICC line in satisfactory position, tip is in 

the SVC.



OSSEOUS STRUCTURES:

No significant abnormalities.



VISUALIZED UPPER ABDOMEN:

Normal.



OTHER FINDINGS:

None.



IMPRESSION:

Status post PICC placement, the tip of the catheter is in the SVC. No 

pneumothorax identified.

## 2018-04-19 NOTE — MRI
MRI right ankle 



History: Ulcer.  Evaluate for osteomyelitis. 



Comparison: None available. 



Technique: Multi-echo multiplanar sequences were performed through 

the right ankle without the use of intravenous contrast. 



Findings 



1.4 centimeter soft tissue ulcer/defect overlying the medial 

malleolus of the distal tibia. Adjacent signal abnormality within the 

medial aspect of the medial malleolus of the distal tibia best seen 

on series 2, image 25 with increased STIR signal and minimal patchy 

decreased T1 signal suggestive for acute osteomyelitis. 



Reticulation and edema within the circumferential subcutaneous soft 

tissues. 



Anterior extensor tendons are preserved. 



Mild tenosynovitis of the posterior tibial tendon sheath. 



Flexor digitorum longus tendon appears preserved. 



Mild tenosynovitis of the flexor hallucis tendon. 



Peroneal tendons are preserved. 



Probable chronic partial tearing of the posterior tibiofibular 

ligament/syndesmosis with heterotopic bone and or bony exostosis 

emanating from the posterior lateral cortex of the distal tibia 

measuring up to 8 millimeters. Clinical correlation. 



Additional chronic partial tearing of the anterior tibiofibular 

ligament/syndesmosis. 



Anterior and posterior talofibular ligaments are grossly preserved. 



Mild increased signal at the level of the Lisfranc ligament which may 

represent a low grade sprain. 



Suggestion of signal changes seen within the medial aspect of the 

middle cuneiform bone suggestive for osteochondral change. 



Achilles tendon is preserved. 



Thickening of the plantar fascia measuring up to 1.2 centimeters with 

adjacent bony spurring suggestive for a moderate plantar fascitis. 



Signal abnormality within the sinus tarsi with decreased T1 signal 

and increased STIR signal suggestive for a moderate sinus tarsi 

syndrome.  Reactive edema seen within the mid talus. 



Small ankle joint effusion. 



Narrowing of the tibiotalar joint space. 



Degenerative changes with bony spurring noted at the dorsal aspect of 

the talonavicular joint space. 



Degenerative changes noted at the articulation of the anterior 

calcaneus and navicular bone. 



Mild increased signal within the deep fibers of the deltoid ligament. 



Prominence of the middle subtalar joint space. 



Impression: 



1. 1.4 centimeter soft tissue ulcer/defect overlying the medial 

malleolus of the distal tibia. Adjacent signal abnormality within the 

medial aspect of the medial malleolus of the distal tibia best seen 

on series 2, image 25 with increased STIR signal and minimal patchy 

decreased T1 signal suggestive for acute osteomyelitis. 



2. Mild tenosynovitis of the posterior tibial tendon sheath. 



3. Mild tenosynovitis of the flexor hallucis tendon. 



4. Probable chronic partial tearing of the posterior tibiofibular 

ligament/syndesmosis with heterotopic bone and or bony exostosis 

emanating from the posterior lateral cortex of the distal tibia 

measuring up to 8 millimeters. Clinical correlation. 



5. Additional chronic partial tearing of the anterior tibiofibular 

ligament/syndesmosis. 



6. Mild increased signal at the level of the Lisfranc ligament which 

may represent a low grade sprain. 



7. Suggestion of signal changes seen within the medial aspect of the 

middle cuneiform bone suggestive for osteochondral change. 



8. Thickening of the plantar fascia measuring up to 1.2 centimeters 

with adjacent bony spurring suggestive for a moderate plantar 

fascitis. 



9. Signal abnormality within the sinus tarsi with decreased T1 signal 

and increased STIR signal suggestive for a moderate sinus tarsi 

syndrome.  Reactive edema seen within the mid talus. 



10. Small ankle joint effusion. 



11. Narrowing of the tibiotalar joint space. 



12. Degenerative changes with bony spurring noted at the dorsal 

aspect of the talonavicular joint space. 



13. Degenerative changes noted at the articulation of the anterior 

calcaneus and navicular bone. 



14. Mild increased signal within the deep fibers of the deltoid 

ligament. 



15. Prominence of the middle subtalar joint space.

## 2018-04-19 NOTE — PN
DATE:  04/19/2018



SUBJECTIVE:  The patient is in bed, was seen in room 564, bed 1.  No fevers

and no chills and he states that his leg is much improved.



PHYSICAL EXAMINATION:

VITAL SIGNS:  Temperature is 98, blood pressure is 137/75, heart rate of

96, respiratory rate of 20.  The patient is saturating at 94%.

HEENT:  Unremarkable.

NECK:  Supple.

Lungs:  Have decreased breath sounds.

HEART:  Normal S1, S2.

ABDOMEN:  Soft, nontender.



LABORATORY DATA:  Reveals a white count of 5000 with a hemoglobin of 10,

platelets of 126, sed rate is 30.  Coagulation is noted.  BUN of 21,

creatinine of 0.9.  Urinalysis is noted.  Microbiology reveals an ankle

culture is positive for Corynebacterium species and heavy growth of

Enterobacter cloacae.  Enterobacter cloacae is _____ to ertapenem, but

sensitive to Cipro, cefepime, and meropenem.  There is no sensitivity for

Corynebacterium species with moderate growth.  Review of the orders reveals

the patient to be on meropenem and vancomycin.  The blood cultures are

reported to be negative.  The MRI reveals the patient to have acute

osteomyelitis.



ASSESSMENT AND PLAN:  This is a 77-year-old male with diabetes mellitus,

prostate cancer, hypertension, coronary artery disease, hyperlipidemia,

cataract, arthritis, degenerative joint disease, right ankle medial ulcer

and cellulitis with Enterobacter cloacae and Corynebacterium. 

Osteomyelitis by MRI.  Currently, on vancomycin and meropenem.  The patient

also had an ankle MRI, which reveals mild tenosynovitis, probable chronic

partial tearing of the posterior tibiofibular ligament.  Should have

vascular workup and we will follow with you.







__________________________________________

Arian Rivera MD



DD:  04/19/2018 19:04:20

DT:  04/19/2018 19:07:04

Job # [id number]

## 2018-04-19 NOTE — CP.PCM.PN
Subjective





- Date & Time of Evaluation


Date of Evaluation: 04/19/18


Time of Evaluation: 09:08





- Subjective


Subjective: 





Podiatry Progress note: Dr. Ayala/Dr. Curtis





77 year old male was seen and evaluated this morning with attending Dr. Curtis 

for right leg wound. Patient reports that he is feeling a lot better and the 

pain has reduced a lot. Denies of having any acute overnight events. Denies of 

having any F/N/V/C/SOB/CP/headache. Denies of any other pedal complains at this 

time. 





Objective





- Vital Signs/Intake and Output


Vital Signs (last 24 hours): 


 











Temp Pulse Resp BP Pulse Ox


 


 98.2 F   56 L  18   143/81   94 L


 


 04/19/18 06:00  04/19/18 06:00  04/19/18 06:00  04/19/18 06:00  04/19/18 06:00








Intake and Output: 


 











 04/19/18 04/19/18





 06:59 18:59


 


Intake Total 540 


 


Output Total 1500 


 


Balance -960 














- Medications


Medications: 


 Current Medications





Amlodipine Besylate (Norvasc)  10 mg PO DAILY [facility]


   Last Admin: 04/18/18 10:11 Dose:  10 mg


Aspirin (Ecotrin)  325 mg PO DAILY [facility]


   Last Admin: 04/18/18 10:20 Dose:  Not Given


Bicalutamide (Casodex)  50 mg PO DAILY [facility]


   Last Admin: 04/18/18 10:58 Dose:  50 mg


Glimepiride (Amaryl)  2 mg PO ACBD [facility]


   Last Admin: 04/19/18 08:02 Dose:  2 mg


Vancomycin HCl (Vancomycin 1gm)  1 gm in 250 mls @ 167 mls/hr IVPB Q12H [facility]


   PRN Reason: Protocol


   Last Admin: 04/19/18 08:03 Dose:  167 mls/hr


Meropenem (Merrem Iv 1 Gm Premix)  50 mls @ 100 mls/hr IVPB Q8 [facility]


   PRN Reason: Protocol


   Stop: 04/27/18 19:29


   Last Admin: 04/19/18 06:35 Dose:  100 mls/hr


Insulin Human Lispro (Humalog High)  0 units SC ACHS [facility]


   PRN Reason: Protocol


   Last Admin: 04/19/18 08:02 Dose:  4 units


Metformin HCl (Glucophage)  1,000 mg PO BID [facility]


   Last Admin: 04/18/18 17:05 Dose:  1,000 mg


Methylprednisolone (Solu-Medrol)  40 mg IVP Q8H [facility]


   Last Admin: 04/19/18 01:44 Dose:  40 mg


Mupirocin (Bactroban Ointment)  0 gm TOP BID [facility]


   Last Admin: 04/18/18 17:05 Dose:  1 gm











- Labs


Labs: 


 





 04/19/18 06:20 











- Constitutional


Appears: Well, Non-toxic, No Acute Distress





- Extremities Exam


Additional comments: 





Right LE focused exam:





VASC: DP/PT pulses are palpable 2/4, Cap refill time: < 3 sec to all digits, 

Temp gradient: warm to cool from proximal to distal, no pitting or non-pitting 

edema noted on the RLE





DERM: Wound measuring approx. 3.5 cm x 3.5 cm x 0.1 cm on the medial aspect of 

the right ankle, wound base is mainly granular with islands of fibrosis, 

periwound erythema noted which appears to be decreasing, no active drainage, no 

purulence, no tunneling, no undermining, no probe to bone, no malodor





NEURO: protective sensation grossly intact





ORTHO: Pain on palpation of the wound, AROM and PROM at the AJ present in all 4 

directions





- Neurological Exam


Neurological Exam: Alert, Awake, Oriented x3





- Psychiatric Exam


Psychiatric exam: Normal Affect, Normal Mood





Assessment and Plan





- Assessment and Plan (Free Text)


Assessment: 





77 year old male with right medial ankle wound (nenis grade 1)


Plan: 





Patient seen and evaluated with attending Dr. Curtis


Labs, vitals and charts reviewed


Wound cleaned with saline and dressing applied using bactroban, optifoam


Wound cx: prelim - G negative rods


Right ankle X-rays: No acute changes, no signs of acute OM, increase in soft 

tissue density on medial ankle consistent with cellulitic changes


Await MRI


Continue methylprednisolone for 1 week


IV abx as per ID - Meropenem and Vancomycin


Podiatry to follow patient while in-house


Upon discharge, follow up at wound care center for further care

## 2018-04-20 VITALS
TEMPERATURE: 98.2 F | SYSTOLIC BLOOD PRESSURE: 136 MMHG | DIASTOLIC BLOOD PRESSURE: 82 MMHG | HEART RATE: 48 BPM | OXYGEN SATURATION: 98 %

## 2018-04-20 RX ADMIN — INSULIN LISPRO SCH: 100 INJECTION, SOLUTION INTRAVENOUS; SUBCUTANEOUS at 07:35

## 2018-04-20 RX ADMIN — MEROPENEM SCH MLS/HR: 1 INJECTION INTRAVENOUS at 07:32

## 2018-04-20 RX ADMIN — INSULIN LISPRO SCH: 100 INJECTION, SOLUTION INTRAVENOUS; SUBCUTANEOUS at 16:25

## 2018-04-20 RX ADMIN — MEROPENEM SCH MLS/HR: 1 INJECTION INTRAVENOUS at 13:50

## 2018-04-20 RX ADMIN — INSULIN LISPRO SCH: 100 INJECTION, SOLUTION INTRAVENOUS; SUBCUTANEOUS at 11:12

## 2018-04-20 RX ADMIN — ASPIRIN SCH MG: 325 TABLET, DELAYED RELEASE ORAL at 09:54

## 2018-04-20 RX ADMIN — VANCOMYCIN HYDROCHLORIDE SCH MLS/HR: 1 INJECTION, POWDER, LYOPHILIZED, FOR SOLUTION INTRAVENOUS at 07:31

## 2018-04-20 NOTE — PN
DATE:



SUBJECTIVE:  A 77-year-old diabetic male seen at bedside for continued

evaluation and management of osteomyelitis of his right distal lower leg,

ankle.  The patient states that he is feeling better with each passing day.

He denies having any fever, chills, nausea, shortness of breath, or

vomiting.



The patient's vital signs reveal temperature of 98.6, pulse rate of 96,

blood pressure of 151/95, respiratory rate of 20.



LABORATORY FINDINGS:  Reveal white count of 5.9, hemoglobin of 10.6,

hematocrit of 32.6, platelet count of 126, and his ESR is 30.  Microbiology

of the right ankle reveals Enterobacter cloacae and Corynebacterium

species.  MRI of the right ankle taken on 04/19/2018 reveals a 1.4 cm soft

tissue ulcer defect overlying the medial malleolus of the distal tibia. 

There is increased STIR signal and minimal patchy decreased T1 signal,

which is indicative of acute osteomyelitis at this region.



OBJECTIVE:  Palpable pedal pulses noted bilaterally.  Capillary filling

time is within normal limits bilaterally.  There is decreased temperature

gradient warm _____ from proximal to distal.  There is no noted pitting or

nonpitting lower extremity edema.  There is a full-thickness ulceration

that measures approximately 3.2 x 3.2 x 0.1 cm on the medial aspect of the

right ankle.  Base of the ulcer is primarily fibrotic with some necrotic

and granulation tissue dispersed throughout.  There is no probing to bone. 

There is no malodor.  There is no purulence to suggest underlying abscess

formation.  The patient has absent protective sensation noted bilaterally.



ASSESSMENT:  A 77-year-old male with diabetic right medial ankle ulceration

with osteomyelitis of the medial malleoli of the distal tibia.



PLAN:  The patient was seen with Dr. Briones.  The wound was cleansed.  We

will continue to apply Bactroban and Optifoam dressing.  PICC line is in

place.  Infectious disease note was read and appreciated.  The patient will

need 4 to 6 weeks' of IV antibiotics.  The patient is requesting if it is

feasible, he can come once a day for IV antibiotics at the hospital and for

local wound care as opposed to going to an outpatient facility.  Gaudencio was

told that it is up to the infectious disease doctor to determine what is

the best course of action, but if he needs to go to a subacute facility, he

will do so.  The patient was told that he will need compressive dressings

upon discharge and he will need to follow up regardless of where he gets

discharged at the wound center at least once a week, possibly twice.  The

patient agreed.  The patient will be seen daily until discharge.







__________________________________________

Farhan Ayala DPM



DD:  04/20/2018 12:42:25

DT:  04/20/2018 12:46:04

Job # [id number]

## 2018-04-20 NOTE — CP.PCM.PN
Subjective





- Date & Time of Evaluation


Date of Evaluation: 04/20/18


Time of Evaluation: 12:15





- Subjective


Subjective: 





Comfortable, no fevers, not in distress, afebrile, improved pain in the right 

foot.





Objective





- Vital Signs/Intake and Output


Vital Signs (last 24 hours): 


 











Temp Pulse Resp BP Pulse Ox


 


 98.2 F   48 L  20   136/82   98 


 


 04/20/18 14:00  04/20/18 14:00  04/20/18 14:00  04/20/18 14:00  04/20/18 14:00








Intake and Output: 


 











 04/20/18 04/21/18





 18:59 06:59


 


Intake Total 840 


 


Balance 840 














- Medications


Medications: 


 Current Medications





Amlodipine Besylate (Norvasc)  10 mg PO DAILY [facility]


   Last Admin: 04/20/18 09:54 Dose:  10 mg


Aspirin (Ecotrin)  325 mg PO DAILY [facility]


   Last Admin: 04/20/18 09:54 Dose:  325 mg


Bicalutamide (Casodex)  50 mg PO DAILY [facility]


   Last Admin: 04/20/18 09:58 Dose:  50 mg


Glimepiride (Amaryl)  2 mg PO ACBD [facility]


   Last Admin: 04/20/18 17:27 Dose:  2 mg


Insulin Human Lispro (Humalog High)  0 units SC ACHS [facility]


   PRN Reason: Protocol


   Last Admin: 04/20/18 16:25 Dose:  Not Given


Metformin HCl (Glucophage)  1,000 mg PO BID [facility]


   Last Admin: 04/20/18 17:27 Dose:  1,000 mg


Mupirocin (Bactroban Ointment)  0 gm TOP BID [facility]


   Last Admin: 04/20/18 17:27 Dose:  1 gm











- Labs


Labs: 


 





 04/19/18 06:20 











- Constitutional


Appears: Chronically Ill





- Head Exam


Head Exam: NORMAL INSPECTION





- ENT Exam


ENT Exam: Mucous Membranes Moist





- Neck Exam


Neck Exam: absent: Meningismus





- Respiratory Exam


Respiratory Exam: Decreased Breath Sounds





- Cardiovascular Exam


Cardiovascular Exam: +S1, +S2





- GI/Abdominal Exam


GI & Abdominal Exam: Soft.  absent: Tenderness





- Extremities Exam


Additional comments: 





right foot with dressings in place





Assessment and Plan





- Assessment and Plan (Free Text)


Plan: 


Assessment


Right leg / foot skin and skin structure infection with osteomyelitis, growing 

Enterobacter on superficial swab of the ulcer


DM


Prostate CA


HTN


dyslipidemia


CAD


cataracts


arthritis


degenerative joint disease





Plan


On Vancomycin and Merrem and can be switched to Ertapenem and Doxycycline for 

at least 4-6 weeks with weekly ESR, CRP, CBC, CMP while on antibiotics, with 

outpatient follow up with Podiatry


will monitor clinically

## 2018-04-20 NOTE — PN
DATE:



SUBJECTIVE:  The patient is 77 years old, seen and examined, lying in bed. 

Seems to be comfortable.  He states his pain is much better.



PHYSICAL EXAMINATION:

VITAL SIGNS:  He is afebrile, pulse 96, respirations 20, blood pressure

147/88.

LUNGS:  Bilateral good airflow.  No rhonchi or crackle.

HEART:  S1 and S2 audible.

ABDOMEN:  Soft.  Nontender.  No rebound.  No guarding.

NEUROLOGIC:  The patient is awake and alert, able to communicate.



LABORATORY EXAM:  Blood sugar is 94.



ASSESSMENT:

1.  Non-insulin-dependent diabetes.

2.  Hypertension.

3.  Right medial malleolus nonhealing ulcer for last 3 months.

4.  Right medial malleolus osteomyelitis.



PLAN:  The patient got PICC line placed.  Currently, he is on meropenem and

vancomycin.  The patient wants to go home.  We will reach out to ID if

medications can be switch around to daily IV along with p.o. combination

and in that case, he will be discharged home.  Otherwise, he needs to be in

subacute rehab.





__________________________________________

Kelly Briones MD





DD:  04/20/2018 12:09:29

DT:  04/20/2018 12:27:18

Job # [id number]

## 2018-04-21 NOTE — DS
DISCHARGE SUMMARY



This is an addendum to today's progress note.



The patient had PICC line placed yesterday.  Dr. Hurtado was contacted and

the patient is being discharged on doxycycline.  He will finish his course

of antibiotic and will follow up with his PMD.  The patient was sent home

where he will get his IV infusion and doxycycline.





__________________________________________

Kelly Briones MD



DD:  04/20/2018 18:58:01

DT:  04/20/2018 18:59:01

Job # [id number]